# Patient Record
Sex: MALE | Race: WHITE | NOT HISPANIC OR LATINO | URBAN - METROPOLITAN AREA
[De-identification: names, ages, dates, MRNs, and addresses within clinical notes are randomized per-mention and may not be internally consistent; named-entity substitution may affect disease eponyms.]

---

## 2023-02-23 ENCOUNTER — LAB OUTSIDE AN ENCOUNTER (OUTPATIENT)
Dept: URBAN - METROPOLITAN AREA CLINIC 63 | Facility: CLINIC | Age: 68
End: 2023-02-23

## 2023-02-23 ENCOUNTER — OFFICE VISIT (OUTPATIENT)
Dept: URBAN - METROPOLITAN AREA CLINIC 63 | Facility: CLINIC | Age: 68
End: 2023-02-23
Payer: COMMERCIAL

## 2023-02-23 ENCOUNTER — WEB ENCOUNTER (OUTPATIENT)
Dept: URBAN - METROPOLITAN AREA CLINIC 63 | Facility: CLINIC | Age: 68
End: 2023-02-23

## 2023-02-23 VITALS
HEART RATE: 74 BPM | TEMPERATURE: 98.2 F | BODY MASS INDEX: 31.15 KG/M2 | OXYGEN SATURATION: 94 % | WEIGHT: 263.8 LBS | DIASTOLIC BLOOD PRESSURE: 70 MMHG | SYSTOLIC BLOOD PRESSURE: 120 MMHG | HEIGHT: 77 IN

## 2023-02-23 DIAGNOSIS — Z86.010 PERSONAL HISTORY OF COLONIC POLYPS: ICD-10-CM

## 2023-02-23 DIAGNOSIS — Z80.0 FAMILY HISTORY OF COLON CANCER IN FATHER: ICD-10-CM

## 2023-02-23 PROCEDURE — 99202 OFFICE O/P NEW SF 15 MIN: CPT | Performed by: PHYSICIAN ASSISTANT

## 2023-02-23 RX ORDER — ATORVASTATIN CALCIUM 10 MG/1
TAKE 1 TABLET BY MOUTH DAILY TABLET, FILM COATED ORAL
Qty: 90 EACH | Refills: 2 | Status: ACTIVE | COMMUNITY

## 2023-02-23 RX ORDER — DOXAZOSIN 8 MG/1
TAKE 1 TABLET BY MOUTH DAILY TABLET ORAL
Qty: 90 EACH | Refills: 2 | Status: ACTIVE | COMMUNITY

## 2023-02-23 RX ORDER — SILDENAFIL 100 MG/1
TAKE 1 TABLET BY MOUTH 1 HOUR BEFORE SEXUAL ACTIVITY TABLET, FILM COATED ORAL
Qty: 4 EACH | Refills: 0 | Status: ACTIVE | COMMUNITY

## 2023-02-23 RX ORDER — MOMETASONE FUROATE 1 MG/G
APPLY TO ACTIVE LESIONS EVERY DAY FOR 3 WEEKS CREAM TOPICAL
Qty: 45 GRAM | Refills: 3 | Status: ACTIVE | COMMUNITY

## 2023-02-23 NOTE — HPI-TODAY'S VISIT:
68-year-old male with history of polyps presents to the office to schedule surveillance colonoscopy.  He has no GI complaints.  He has no pyrosis, dysphagia, odynophagia, nausea, vomiting, abdominal pain, constipation, diarrhea, rectal bleeding. He has a history of hemorrhoids since his teenage years.   Last colonoscopy was done in Feb 2018. He had 2 small polyps removed from the colon. Additional findings includi\ed diverticulosis and internal hemorrhoids.   He bought his last colonoscopy report with him which read that he should receive MAC due to discomfort with concious sedation. The patient does not recall having any problems during his last colonoscopy.   He has a family history of colon cancer in his father at age 52.

## 2023-02-24 PROBLEM — 428283002: Status: ACTIVE | Noted: 2023-02-23

## 2023-03-31 ENCOUNTER — CLAIMS CREATED FROM THE CLAIM WINDOW (OUTPATIENT)
Dept: URBAN - METROPOLITAN AREA SURGERY CENTER 4 | Facility: SURGERY CENTER | Age: 68
End: 2023-03-31
Payer: COMMERCIAL

## 2023-03-31 ENCOUNTER — TELEPHONE ENCOUNTER (OUTPATIENT)
Dept: URBAN - METROPOLITAN AREA CLINIC 63 | Facility: CLINIC | Age: 68
End: 2023-03-31

## 2023-03-31 ENCOUNTER — LAB OUTSIDE AN ENCOUNTER (OUTPATIENT)
Dept: URBAN - METROPOLITAN AREA CLINIC 63 | Facility: CLINIC | Age: 68
End: 2023-03-31

## 2023-03-31 DIAGNOSIS — Q43.8 OTHER SPECIFIED CONGENITAL MALFORMATIONS OF INTESTINE: ICD-10-CM

## 2023-03-31 DIAGNOSIS — K57.30 DIVERTCULOSIS OF LG INT W/O PERFORATION OR ABSCESS W/O BLEEDING: ICD-10-CM

## 2023-03-31 DIAGNOSIS — Z86.010 PERSONAL HISTORY OF COLONIC POLYPS: ICD-10-CM

## 2023-03-31 DIAGNOSIS — K64.0 GRADE I HEMORRHOIDS: ICD-10-CM

## 2023-03-31 PROCEDURE — G0105 COLORECTAL SCRN; HI RISK IND: HCPCS | Performed by: INTERNAL MEDICINE

## 2023-03-31 RX ORDER — MOMETASONE FUROATE 1 MG/G
APPLY TO ACTIVE LESIONS EVERY DAY FOR 3 WEEKS CREAM TOPICAL
Qty: 45 GRAM | Refills: 3 | Status: ACTIVE | COMMUNITY

## 2023-03-31 RX ORDER — SILDENAFIL 100 MG/1
TAKE 1 TABLET BY MOUTH 1 HOUR BEFORE SEXUAL ACTIVITY TABLET, FILM COATED ORAL
Qty: 4 EACH | Refills: 0 | Status: ACTIVE | COMMUNITY

## 2023-03-31 RX ORDER — ATORVASTATIN CALCIUM 10 MG/1
TAKE 1 TABLET BY MOUTH DAILY TABLET, FILM COATED ORAL
Qty: 90 EACH | Refills: 2 | Status: ACTIVE | COMMUNITY

## 2023-03-31 RX ORDER — DOXAZOSIN 8 MG/1
TAKE 1 TABLET BY MOUTH DAILY TABLET ORAL
Qty: 90 EACH | Refills: 2 | Status: ACTIVE | COMMUNITY

## 2023-04-20 ENCOUNTER — DASHBOARD ENCOUNTERS (OUTPATIENT)
Age: 68
End: 2023-04-20

## 2023-04-20 ENCOUNTER — OFFICE VISIT (OUTPATIENT)
Dept: URBAN - METROPOLITAN AREA CLINIC 63 | Facility: CLINIC | Age: 68
End: 2023-04-20
Payer: COMMERCIAL

## 2023-04-20 VITALS
DIASTOLIC BLOOD PRESSURE: 78 MMHG | TEMPERATURE: 97.2 F | HEART RATE: 97 BPM | SYSTOLIC BLOOD PRESSURE: 122 MMHG | BODY MASS INDEX: 30.7 KG/M2 | OXYGEN SATURATION: 100 % | HEIGHT: 77 IN | WEIGHT: 260 LBS

## 2023-04-20 DIAGNOSIS — Z86.010 PERSONAL HISTORY OF COLONIC POLYPS: ICD-10-CM

## 2023-04-20 PROCEDURE — 99212 OFFICE O/P EST SF 10 MIN: CPT | Performed by: PHYSICIAN ASSISTANT

## 2023-04-20 RX ORDER — ATORVASTATIN CALCIUM 10 MG/1
TAKE 1 TABLET BY MOUTH DAILY TABLET, FILM COATED ORAL
Qty: 90 EACH | Refills: 2 | Status: ACTIVE | COMMUNITY

## 2023-04-20 RX ORDER — DOXAZOSIN 8 MG/1
TAKE 1 TABLET BY MOUTH DAILY TABLET ORAL
Qty: 90 EACH | Refills: 2 | Status: ACTIVE | COMMUNITY

## 2023-04-20 RX ORDER — MOMETASONE FUROATE 1 MG/G
APPLY TO ACTIVE LESIONS EVERY DAY FOR 3 WEEKS CREAM TOPICAL
Qty: 45 GRAM | Refills: 3 | Status: ACTIVE | COMMUNITY

## 2023-04-20 RX ORDER — SILDENAFIL 100 MG/1
TAKE 1 TABLET BY MOUTH 1 HOUR BEFORE SEXUAL ACTIVITY TABLET, FILM COATED ORAL
Qty: 4 EACH | Refills: 0 | Status: ACTIVE | COMMUNITY

## 2023-04-20 NOTE — HPI-TODAY'S VISIT:
68-year-old male with personal history of colon polyps presents to the office after surveillance colonoscopy which was done on March 31, 2023.  His colonoscopy demonstrated a tortuous and redundant colon.  Abdominal pressure was required.  There was significant looping of the scope.  The scope was advanced somewhere into what appeared to be the proximal transverse colon and then the patient had an oxygen saturation around 85% which was not coming up and for this reason, his procedure was aborted.  He was then sent for a CT colonography which was done on April 7, 2023 which did not show any large polyp, stricture or mass.  Repeat CT colonography was recommended in 5 years. He follows up doing well.  He had no problems following his procedure.  He has no GI complaints. He has a family history of colon cancer in his father at age 52.